# Patient Record
Sex: FEMALE | Race: WHITE | NOT HISPANIC OR LATINO | URBAN - NONMETROPOLITAN AREA
[De-identification: names, ages, dates, MRNs, and addresses within clinical notes are randomized per-mention and may not be internally consistent; named-entity substitution may affect disease eponyms.]

---

## 2018-09-04 PROBLEM — H26.491: Noted: 2018-03-06

## 2018-09-04 PROBLEM — Z96.1: Noted: 2018-03-06

## 2018-09-04 PROBLEM — H01.024: Noted: 2018-03-06

## 2018-09-04 PROBLEM — H18.613: Noted: 2018-03-06

## 2018-09-04 PROBLEM — H16.223: Noted: 2018-03-06

## 2018-09-04 PROBLEM — H01.021: Noted: 2018-03-06

## 2018-09-04 PROBLEM — H10.013: Noted: 2018-09-04

## 2019-03-11 ENCOUNTER — IMPORTED ENCOUNTER (OUTPATIENT)
Dept: URBAN - NONMETROPOLITAN AREA CLINIC 1 | Facility: CLINIC | Age: 79
End: 2019-03-11

## 2019-03-11 PROCEDURE — 99213 OFFICE O/P EST LOW 20 MIN: CPT

## 2019-03-11 NOTE — PATIENT DISCUSSION
Acute Follicular Conjunctivitis OU:  - Discussed diagnosis in detail with patient - Signs/symptoms associated discussed- Allergic component with GPC noted OU. - Patient had these same problems 1 year ago was started on [facility] and did well. - Start FML or Flarex OD TID coupon given and instructions on tapering instructed. She is to call in a few days to let us know how she is doing. - Recommended cool compresses - Recommend Refresh or Systane Complete through out the day Sample of Systane Complete given today  - Continue to monitor PRNPseudophakia OU with PCO OD - Discussed diagnosis in detail with patient- Patient is stable and doing well with no glare complaint- PCO noted today but stable and no treatment needed at this time - OD done by Dr. Tamara Venegas and Yogesh Razo  was done in [facility] - Patient has been followed by Dr. Tamara Venegas- Continue to monitorBlepharitis OU - Discussed diagnosis in detail with patient- Recommend patient using J & J baby shampoo to scrub lid daily- Continue to monitorKeratonconus (stable)- Discussed diagnosis in detail with patient- Patient is stable at this time - Continue to monitorDES OU- Discussed diagnosis in detail with patient- Discussed signs and symptoms of progression- Recommend patient drinking plenty of water and starting Omega 3’s - Recommend Refresh Chivo or Systane throughout the day.  Coupon given for Refresh ans sample of Systane Complete given today- Consider plugs in the future if no improvement - Continue to monitorPresbyopia OU - Discussed diagnosis in detail with patient - New glasses Rx given today - Continue to monitor; 's Notes: MR 9/4/18

## 2019-08-05 ENCOUNTER — IMPORTED ENCOUNTER (OUTPATIENT)
Dept: URBAN - NONMETROPOLITAN AREA CLINIC 1 | Facility: CLINIC | Age: 79
End: 2019-08-05

## 2019-08-05 PROBLEM — H18.613: Noted: 2018-03-06

## 2019-08-05 PROBLEM — H16.223: Noted: 2018-03-06

## 2019-08-05 PROBLEM — H00.024: Noted: 2019-08-05

## 2019-08-05 PROBLEM — H26.491: Noted: 2018-03-06

## 2019-08-05 PROBLEM — Z96.1: Noted: 2018-03-06

## 2019-08-05 PROBLEM — H01.021: Noted: 2018-03-06

## 2019-08-05 PROBLEM — H01.024: Noted: 2018-03-06

## 2019-08-05 PROCEDURE — 99213 OFFICE O/P EST LOW 20 MIN: CPT

## 2019-08-05 NOTE — PATIENT DISCUSSION
External Hordeolum THERESE - Discussed diagnosis in detail with patient - Start HOT compresses 10-15 mins on and 45 mins off- Start Keflex 500 mg TID x 7 days - Recommend J & J baby shampoo to scrub lids daily - Continue to monitor- RTC A/S -------------------------------------previous notes-------------------------------Pseudophakia OU with PCO OD - Discussed diagnosis in detail with patient- Patient is stable and doing well with no glare complaint- PCO noted today but stable and no treatment needed at this time - OD done by Dr. Lynda Shields and OS  was done in [address] - Patient has been followed by Dr. Lynda Shields- Continue to monitorBlepharitis OU - Discussed diagnosis in detail with patient- Recommend patient using J & J baby shampoo to scrub lid daily- Continue to monitorKeratonconus (stable)- Discussed diagnosis in detail with patient- Patient is stable at this time - Continue to monitorDES OU- Discussed diagnosis in detail with patient- Discussed signs and symptoms of progression- Recommend patient drinking plenty of water and starting Omega 3’s - Recommend Refresh Chivo or Systane throughout the day.  Coupon given for Refresh ans sample of Systane Complete given today- Consider plugs in the future if no improvement - Continue to monitorPresbyopia OU - Discussed diagnosis in detail with patient - New glasses Rx given today - Continue to monitor; 's Notes: MR 9/4/18

## 2020-03-06 ENCOUNTER — IMPORTED ENCOUNTER (OUTPATIENT)
Dept: URBAN - NONMETROPOLITAN AREA CLINIC 1 | Facility: CLINIC | Age: 80
End: 2020-03-06

## 2020-03-06 PROBLEM — Z96.1: Noted: 2020-03-06

## 2020-03-06 PROBLEM — H01.021: Noted: 2020-03-06

## 2020-03-06 PROBLEM — H01.024: Noted: 2020-03-06

## 2020-03-06 PROBLEM — H16.223: Noted: 2020-03-06

## 2020-03-06 PROBLEM — H26.491: Noted: 2020-03-06

## 2020-03-06 PROBLEM — H18.613: Noted: 2020-03-06

## 2020-03-06 PROCEDURE — 92014 COMPRE OPH EXAM EST PT 1/>: CPT

## 2020-03-06 PROCEDURE — 92015 DETERMINE REFRACTIVE STATE: CPT

## 2020-03-06 NOTE — PATIENT DISCUSSION
Pseudophakia OU with PCO OD - Discussed diagnosis in detail with patient- PCO noted today but stable and no treatment needed at this time - OD done by Dr. Yadi Geronimo and OS  was done in [facility] - BAT done today OD 20/30- and OS 20/40-- Patient has been followed by Dr. Yadi Geronimo- Continue to monitorBlepharitis OU - Discussed diagnosis in detail with patient- Recommend patient using J & J baby shampoo to scrub lid daily- Continue to monitorKeratonconus (stable)- Discussed diagnosis in detail with patient- Patient is stable at this time - Continue to monitorDES OU- Discussed diagnosis in detail with patient- Discussed signs and symptoms of progression- Recommend patient drinking plenty of water and starting Omega 3’s - Recommend Refresh Chivo or Systane throughout the day. - Consider plugs in the future if no improvement - Continue to monitorPresbyopia OU - Discussed diagnosis in detail with patient - New glasses Rx given today MR done by Dr. Checo Valle today - Continue to monitor *****Patient may be starting Plaquenil due to having arthritis explained to patient that if she does start this medication we will need to do testing VF 10-2 and OCT MAC in the future. Patient understands.  Patient will call us if she decides to start medication patient will call and need to set up an appointment for [facility] with VF 10-2 and OCT MAC other wise we will see patient back in 1 year for complete ***; 's Notes: MR 9/4/18

## 2021-03-08 ENCOUNTER — IMPORTED ENCOUNTER (OUTPATIENT)
Dept: URBAN - NONMETROPOLITAN AREA CLINIC 1 | Facility: CLINIC | Age: 81
End: 2021-03-08

## 2021-03-08 PROBLEM — H01.021: Noted: 2021-03-08

## 2021-03-08 PROBLEM — H35.3132: Noted: 2021-03-08

## 2021-03-08 PROBLEM — H16.223: Noted: 2021-03-08

## 2021-03-08 PROBLEM — H01.024: Noted: 2021-03-08

## 2021-03-08 PROBLEM — Z96.1: Noted: 2021-03-08

## 2021-03-08 PROBLEM — H18.613: Noted: 2021-03-08

## 2021-03-08 PROBLEM — H26.491: Noted: 2021-03-08

## 2021-03-08 PROCEDURE — 92015 DETERMINE REFRACTIVE STATE: CPT

## 2021-03-08 PROCEDURE — 92014 COMPRE OPH EXAM EST PT 1/>: CPT

## 2021-03-08 NOTE — PATIENT DISCUSSION
Pseudophakia OU with PCO OD - Discussed diagnosis in detail with patient- PCO noted today but stable and no treatment needed at this time - OD done by Dr. Mariely Andrews and OS  was done in [facility] - BAT done today OD 20/400 and OS 20/200- Continue to monitorARMD OS - Discussed diagnosis in detail with patient- Recommend  patient start eye vitamins daily such as Preservision. Sample given 3/8/21- Recommend that patient start following the [address] patient to call or come into the office ASAP if any changes noted from today. Grid given 3/8/21 by Dr. Waqas Harris with instructions on how to use by Dr. Waqas Harris - Continue to monitor- RTC 1 months ARMD with OCT MAC Blepharitis OU - Discussed diagnosis in detail with patient- Recommend patient using J & J baby shampoo to scrub lid daily- Continue to monitorKeratonconus (stable)- Discussed diagnosis in detail with patient- Patient is stable at this time - Continue to monitorDES OU- Discussed diagnosis in detail with patient- Discussed signs and symptoms of progression- Recommend patient drinking plenty of water and starting Omega 3’s - Recommend Refresh Chivo or Systane throughout the day. - Consider plugs in the future if no improvement - Continue to monitorPresbyopia OU - Discussed diagnosis in detail with patient - New glasses Rx given today- Continue to monitor; 's Notes: MR 9/4/18

## 2021-04-09 ENCOUNTER — IMPORTED ENCOUNTER (OUTPATIENT)
Dept: URBAN - NONMETROPOLITAN AREA CLINIC 1 | Facility: CLINIC | Age: 81
End: 2021-04-09

## 2021-04-09 PROBLEM — Z96.1: Noted: 2021-04-09

## 2021-04-09 PROBLEM — H18.613: Noted: 2021-04-09

## 2021-04-09 PROBLEM — H26.491: Noted: 2021-04-09

## 2021-04-09 PROBLEM — H35.372: Noted: 2021-04-09

## 2021-04-09 PROBLEM — H01.024: Noted: 2021-04-09

## 2021-04-09 PROBLEM — H35.3132: Noted: 2021-04-09

## 2021-04-09 PROBLEM — H01.021: Noted: 2021-04-09

## 2021-04-09 PROCEDURE — 92134 CPTRZ OPH DX IMG PST SGM RTA: CPT

## 2021-04-09 PROCEDURE — 92014 COMPRE OPH EXAM EST PT 1/>: CPT

## 2021-04-09 NOTE — PATIENT DISCUSSION
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 Sample given 3/8/21- Recommend that patient continue following the [address] patient to call or come into the office ASAP if any changes noted from today. Grid given 3/8/21 by Dr. Michelle Armenta with instructions on how to use by Dr. Chantal Norman done today OD WNL and OS shows Drusen and 2+ ERM but stable at this time - Continue to monitor / Consider referral to [place] [facility]- RTC 6 months F/U with Optos Pseudophakia OU with PCO OD - Discussed diagnosis in detail with patient- PCO noted today but stable and no treatment needed at this time - OD done by Dr. Dash Samuels and OS  [facility] - BAT done previously OD 20/400 and OS 20/200- Continue to monitor- RTC 6 months with BAT Blepharitis OU - Discussed diagnosis in detail with patient- Recommend patient using J & J baby shampoo to scrub lid daily- Continue to monitorKeratonconus (stable)- Discussed diagnosis in detail with patient- Patient is stable at this time - Continue to monitorDES OU- Discussed diagnosis in detail with patient- Discussed signs and symptoms of progression- Recommend patient drinking plenty of water and starting Omega 3’s - Recommend Refresh Relieva or Systane throughout the day. - Consider plugs in the future if no improvement - Samples of Refresh digital and coupons given for Refresh today - Explained to patient that she needs to lubracate more through out the day- Continue to monitorPresbyopia OU - Discussed diagnosis in detail with patient - Lizbet Green was given at last visit recommend updating - Continue to monitor; 's Notes: MR 18OCT MAC 21

## 2021-10-12 ENCOUNTER — IMPORTED ENCOUNTER (OUTPATIENT)
Dept: URBAN - NONMETROPOLITAN AREA CLINIC 1 | Facility: CLINIC | Age: 81
End: 2021-10-12

## 2021-10-12 PROCEDURE — 99214 OFFICE O/P EST MOD 30 MIN: CPT

## 2021-10-12 PROCEDURE — 92015 DETERMINE REFRACTIVE STATE: CPT

## 2021-10-12 PROCEDURE — 92250 FUNDUS PHOTOGRAPHY W/I&R: CPT

## 2021-10-12 NOTE — PATIENT DISCUSSION
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 Sample given 3/8/21- Recommend that patient continue following the [address] patient to call or come into the office ASAP if any changes noted from today. Grid given 3/8/21 by Dr. Duran Smith with instructions on how to use by Dr. Clifton Flatterkatherine done previously OD WNL and OS shows Drusen and 2+ ERM but stable at this time - Optos done today OD stable and Os shows Drusen- Continue to monitor - RTC 6 months F/U with OCT MAC  Pseudophakia OU with PCO OD - Discussed diagnosis in detail with patient- PCO noted today but stable and no treatment needed at this time - OD done by Dr. Tucker Pathak and OS  was done in [facility] - BAT done previously OD 20/400 and OS 20/200- Continue to monitor- RTC 6 months with BAT Blepharitis OU - Discussed diagnosis in detail with patient- Recommend patient using J & J baby shampoo to scrub lid daily- Continue to monitorKeratonconus (stable)- Discussed diagnosis in detail with patient- Patient is stable at this time - Continue to monitorDES OU- Discussed diagnosis in detail with patient- Discussed signs and symptoms of progression- Recommend patient drinking plenty of water and starting Omega 3’s - Recommend Refresh Relieva or Systane throughout the day. - Consider plugs in the future if no improvement - Samples of Refresh digital and coupons given for Refresh today - Explained to patient that she needs to lubracate more through out the day- Continue to monitorPresbyopia OU - Discussed diagnosis in detail with patient - New Glasses RX given  per patient's request -  Continue to monitor; Dr's Notes: MR 9/4/18OCT MAC 4/9/21

## 2022-04-15 ASSESSMENT — TONOMETRY
OD_IOP_MMHG: 14
OS_IOP_MMHG: 16
OD_IOP_MMHG: 14
OD_IOP_MMHG: 15
OS_IOP_MMHG: 14
OD_IOP_MMHG: 14
OD_IOP_MMHG: 15
OD_IOP_MMHG: 14
OS_IOP_MMHG: 14
OS_IOP_MMHG: 14
OS_IOP_MMHG: 15
OS_IOP_MMHG: 14

## 2022-04-15 ASSESSMENT — VISUAL ACUITY
OS_SC: 20/30
OD_PH: 20/30-
OS_GLARE: 20/40
OS_SC: 20/40+
OD_GLARE: 20/30-
OS_SC: 20/30+
OD_SC: 20/25
OD_SC: 20/25+
OS_PH: 20/30-
OD_SC: 20/25+2
OS_SC: 20/30
OD_GLARE: 20/400
OD_SC: 20/25-
OS_SC: 20/30-
OD_SC: 20/30-
OD_SC: 20/40-
OS_GLARE: 20/200-
OS_SC: 20/30

## 2022-04-19 ENCOUNTER — FOLLOW UP (OUTPATIENT)
Dept: URBAN - NONMETROPOLITAN AREA CLINIC 1 | Facility: CLINIC | Age: 82
End: 2022-04-19

## 2022-04-19 DIAGNOSIS — H35.372: ICD-10-CM

## 2022-04-19 DIAGNOSIS — H35.3132: ICD-10-CM

## 2022-04-19 PROCEDURE — 99214 OFFICE O/P EST MOD 30 MIN: CPT

## 2022-04-19 PROCEDURE — 92134 CPTRZ OPH DX IMG PST SGM RTA: CPT

## 2022-04-19 ASSESSMENT — TONOMETRY
OS_IOP_MMHG: 11
OD_IOP_MMHG: 11

## 2022-04-19 ASSESSMENT — VISUAL ACUITY
OD_SC: 20/25-
OS_SC: 20/25+2

## 2022-04-19 NOTE — PATIENT DISCUSSION
Patient may come back for refraction only when not dilated if chooses not to wait until next appointment.

## 2022-04-19 NOTE — PATIENT DISCUSSION
Continue to follow the [employer] grid, patient to call or come into the office ASAP if any changes noted from today. Grid given with instructions on how to use by Dr. Barbara Fitzpatrick 3/8/21.

## 2022-04-19 NOTE — PATIENT DISCUSSION
Reviewed AREDS II and [employer] grid use. Grid given 3/8/21 by Dr. Rodrigo Landry with instructions on how to use.

## 2022-04-26 ENCOUNTER — FOLLOW UP (OUTPATIENT)
Dept: URBAN - NONMETROPOLITAN AREA CLINIC 1 | Facility: CLINIC | Age: 82
End: 2022-04-26

## 2022-04-26 DIAGNOSIS — H52.4: ICD-10-CM

## 2022-04-26 PROCEDURE — 92015 DETERMINE REFRACTIVE STATE: CPT

## 2022-04-26 ASSESSMENT — VISUAL ACUITY
OD_CC: 20/30-1
OS_CC: 20/30

## 2022-04-26 NOTE — PATIENT DISCUSSION
Reviewed AREDS II and [employer] grid use. Grid given 3/8/21 by Dr. Saurabh Noble with instructions on how to use.

## 2022-04-26 NOTE — PATIENT DISCUSSION
Continue to follow the [employer] grid, patient to call or come into the office ASAP if any changes noted from today. Grid given with instructions on how to use by Dr. Oleg Sterling 3/8/21.